# Patient Record
Sex: FEMALE | Race: BLACK OR AFRICAN AMERICAN
[De-identification: names, ages, dates, MRNs, and addresses within clinical notes are randomized per-mention and may not be internally consistent; named-entity substitution may affect disease eponyms.]

---

## 2018-04-28 ENCOUNTER — HOSPITAL ENCOUNTER (EMERGENCY)
Dept: HOSPITAL 74 - JER | Age: 53
Discharge: HOME | End: 2018-04-28
Payer: COMMERCIAL

## 2018-04-28 DIAGNOSIS — Z98.84: ICD-10-CM

## 2018-04-28 DIAGNOSIS — F10.120: Primary | ICD-10-CM

## 2018-04-28 DIAGNOSIS — E11.9: ICD-10-CM

## 2018-04-28 DIAGNOSIS — E03.9: ICD-10-CM

## 2018-04-28 DIAGNOSIS — I10: ICD-10-CM
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
## 2019-09-13 ENCOUNTER — HOSPITAL ENCOUNTER (INPATIENT)
Dept: HOSPITAL 74 - JER | Age: 54
LOS: 2 days | Discharge: HOME | End: 2019-09-15
Payer: COMMERCIAL

## 2020-02-22 ENCOUNTER — HOSPITAL ENCOUNTER (EMERGENCY)
Dept: HOSPITAL 74 - JERFT | Age: 55
Discharge: HOME | End: 2020-02-22
Payer: COMMERCIAL

## 2020-02-22 VITALS — DIASTOLIC BLOOD PRESSURE: 83 MMHG | HEART RATE: 85 BPM | SYSTOLIC BLOOD PRESSURE: 134 MMHG | TEMPERATURE: 97 F

## 2020-02-22 VITALS — BODY MASS INDEX: 25.7 KG/M2

## 2020-02-22 DIAGNOSIS — Z91.041: ICD-10-CM

## 2020-02-22 DIAGNOSIS — S93.401A: Primary | ICD-10-CM
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
## 2020-02-22 DIAGNOSIS — E03.9: ICD-10-CM

## 2020-02-22 DIAGNOSIS — I10: ICD-10-CM

## 2020-02-22 DIAGNOSIS — E11.9: ICD-10-CM
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
## 2020-02-22 DIAGNOSIS — Z98.84: ICD-10-CM
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
## 2020-02-22 PROCEDURE — 3E0234Z INTRODUCTION OF SERUM, TOXOID AND VACCINE INTO MUSCLE, PERCUTANEOUS APPROACH: ICD-10-PCS

## 2020-02-22 PROCEDURE — 2W3QX1Z IMMOBILIZATION OF RIGHT LOWER LEG USING SPLINT: ICD-10-PCS
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
